# Patient Record
(demographics unavailable — no encounter records)

---

## 2024-11-19 NOTE — REASON FOR VISIT
[Initial Evaluation] : an initial evaluation [FreeTextEntry1] : incisional hernia incidental finding

## 2024-11-19 NOTE — PHYSICAL EXAM
[No Rash or Lesion] : No rash or lesion [Purpura] : no purpura  [Petechiae] : no petechiae [Skin Ulcer] : no ulcer [Skin Induration] : no induration [Alert] : alert [Oriented to Person] : oriented to person [Oriented to Place] : oriented to place [Oriented to Time] : oriented to time [Calm] : calm [de-identified] : NAD [de-identified] : soft, NT, ND, no guarding, lower midline scar and surrounding incisional hernia that is soft and reducible w/o tenderness. no inguinal hernia palpable

## 2024-11-19 NOTE — ASSESSMENT
[FreeTextEntry1] : 69M w/ HTN, HLD, DM, hx of open appendectomy and incisional hernia, presents for evaluation

## 2024-11-19 NOTE — PLAN
[FreeTextEntry1] : - discussed that since his incisional hernia is asymptomatic, we can continue close monitoring, return in 6 mo - discussed that he should follow up with his PCP and possibly cardiologist to continue workup for his syncopal episode

## 2024-11-19 NOTE — HISTORY OF PRESENT ILLNESS
[de-identified] : 69M w/ HTN, HLD, DM, hx of open appendectomy (in [place], long time ago), presents for evaluation of incidentally found incisional hernia and small left inguinal hernia seen on CT from his visit to [facility] ED. Pt had a syncopal episode, fell, and was brought to ED, where CTH was normal, and CTAP was normal other than these incidental findings. Pt states he does not have any pain around the incisional or inguinal hernias, has been eating well, and has been having regular GI function.